# Patient Record
Sex: MALE | URBAN - METROPOLITAN AREA
[De-identification: names, ages, dates, MRNs, and addresses within clinical notes are randomized per-mention and may not be internally consistent; named-entity substitution may affect disease eponyms.]

---
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
## 2025-04-03 NOTE — PROCEDURE: HIGH RISK MEDICATION MONITORING
all other ROS negative except as per HPI Drysol Counseling:  I discussed with the patient the risks of drysol/aluminum chloride including but not limited to skin rash, itching, irritation, burning.

## 2025-07-27 NOTE — PROCEDURE: HIGH RISK MEDICATION MONITORING
-Please complete MRI History and Safety Screening Form.    - Patient cannot be scanned until this form is completed and reviewed in MRI to ensure patient is SAFE and eligible for MRI.  - CALL MRI when this has been successfully completed at [phone].   Tetracycline Pregnancy And Lactation Text: This medication is Pregnancy Category D and not consider safe during pregnancy. It is also excreted in breast milk.